# Patient Record
Sex: MALE | HISPANIC OR LATINO | Employment: FULL TIME | URBAN - METROPOLITAN AREA
[De-identification: names, ages, dates, MRNs, and addresses within clinical notes are randomized per-mention and may not be internally consistent; named-entity substitution may affect disease eponyms.]

---

## 2018-04-10 ENCOUNTER — OFFICE VISIT (OUTPATIENT)
Dept: URGENT CARE | Facility: PHYSICIAN GROUP | Age: 28
End: 2018-04-10
Payer: COMMERCIAL

## 2018-04-10 VITALS
RESPIRATION RATE: 16 BRPM | HEIGHT: 69 IN | SYSTOLIC BLOOD PRESSURE: 112 MMHG | HEART RATE: 59 BPM | BODY MASS INDEX: 22.96 KG/M2 | WEIGHT: 155 LBS | DIASTOLIC BLOOD PRESSURE: 58 MMHG | OXYGEN SATURATION: 99 % | TEMPERATURE: 97.9 F

## 2018-04-10 DIAGNOSIS — B02.9 HERPES ZOSTER WITHOUT COMPLICATION: ICD-10-CM

## 2018-04-10 PROCEDURE — 99214 OFFICE O/P EST MOD 30 MIN: CPT | Performed by: PHYSICIAN ASSISTANT

## 2018-04-10 RX ORDER — HYDROXYZINE PAMOATE 25 MG/1
25 CAPSULE ORAL 3 TIMES DAILY PRN
Qty: 30 CAP | Refills: 0 | Status: SHIPPED | OUTPATIENT
Start: 2018-04-10 | End: 2021-08-30

## 2018-04-10 RX ORDER — DIPHENHYDRAMINE HCL 25 MG
25 TABLET ORAL EVERY 6 HOURS PRN
COMMUNITY
End: 2021-08-30

## 2018-04-10 ASSESSMENT — PAIN SCALES - GENERAL: PAINLEVEL: NO PAIN

## 2018-04-13 ASSESSMENT — ENCOUNTER SYMPTOMS
BACK PAIN: 0
DIARRHEA: 0
EYE DISCHARGE: 0
EYE REDNESS: 0
COUGH: 0
EYE PAIN: 0
HEADACHES: 0
FATIGUE: 0
ABDOMINAL PAIN: 0
SHORTNESS OF BREATH: 0
DIZZINESS: 0
FALLS: 0
FEVER: 0
VOMITING: 0
CHILLS: 0
SORE THROAT: 0

## 2018-04-13 NOTE — PROGRESS NOTES
"Subjective:      eDreck Agustin is a 27 y.o. male who presents with Rash (Left side ABD and back, x 1 week)            Rash   This is a new problem. Episode onset: 7-8 days ago. The problem has been gradually improving since onset. Location: Left flank and upper back. The rash is characterized by blistering, pain and redness. He was exposed to nothing. Pertinent negatives include no congestion, cough, diarrhea, eye pain, fatigue, fever, joint pain, shortness of breath, sore throat or vomiting. Past treatments include anti-itch cream. The treatment provided mild relief. His past medical history is significant for varicella.       Review of Systems   Constitutional: Negative for chills, fatigue, fever and malaise/fatigue.   HENT: Negative for congestion and sore throat.    Eyes: Negative for pain, discharge and redness.   Respiratory: Negative for cough and shortness of breath.    Gastrointestinal: Negative for abdominal pain, diarrhea and vomiting.   Musculoskeletal: Negative for back pain, falls and joint pain.   Skin: Positive for itching and rash.   Neurological: Negative for dizziness and headaches.   All other systems reviewed and are negative.         Objective:     /58   Pulse (!) 59   Temp 36.6 °C (97.9 °F)   Resp 16   Ht 1.74 m (5' 8.5\")   Wt 70.3 kg (155 lb)   SpO2 99%   BMI 23.22 kg/m²    PMH:  has a past medical history of Childhood asthma and Pneumonia.  MEDS:   Current Outpatient Prescriptions:   •  diphenhydrAMINE (BENADRYL) 25 MG Tab, Take 25 mg by mouth every 6 hours as needed for Sleep., Disp: , Rfl:   •  hydrOXYzine pamoate (VISTARIL) 25 MG Cap, Take 1 Cap by mouth 3 times a day as needed for Other (May cause sedation)., Disp: 30 Cap, Rfl: 0  •  guaifenesin-codeine (ROBITUSSIN AC) Solution oral solution, Take 5 mL by mouth at bedtime as needed., Disp: 120 mL, Rfl: 0  •  benzonatate (TESSALON) 100 MG Cap, Take 1 Cap by mouth 3 times a day as needed for Cough., Disp: 60 Cap, Rfl: 0  •  " fluticasone (FLONASE) 50 MCG/ACT nasal spray, Spray 1 Spray in nose 2 times a day., Disp: 16 g, Rfl: 0  ALLERGIES: No Known Allergies  SURGHX: No past surgical history on file.  SOCHX:  reports that he quit smoking about 4 years ago. He has never used smokeless tobacco. He reports that he drinks alcohol. He reports that he does not use drugs.  FH: Family history was reviewed, no pertinent findings to report    Physical Exam   Constitutional: He is oriented to person, place, and time. He appears well-developed and well-nourished. No distress.   HENT:   Head: Normocephalic and atraumatic.   Eyes: Conjunctivae and EOM are normal. Pupils are equal, round, and reactive to light.   Neck: Normal range of motion. Neck supple. No tracheal deviation present.   Cardiovascular: Normal rate.    Pulmonary/Chest: Effort normal and breath sounds normal. No respiratory distress.   Musculoskeletal: Normal range of motion. He exhibits no edema.   Neurological: He is alert and oriented to person, place, and time. Coordination normal.   Skin: Rash noted.        2 discrete erythematous patches-lesions are papular with minimal crusting. One discrete vesicular lesion on back. Lesions do not cross midline. Mild tenderness. Without evidence of secondary infection at this time. Without surrounding streaking or significant increased warmth.   Psychiatric: He has a normal mood and affect. His behavior is normal. Judgment and thought content normal.   Vitals reviewed.              Assessment/Plan:     1. Herpes zoster without complication  - hydrOXYzine pamoate (VISTARIL) 25 MG Cap; Take 1 Cap by mouth 3 times a day as needed for Other (May cause sedation).  Dispense: 30 Cap; Refill: 0    Rash currently follows the T10 dermatome-symptoms are consistent with herpes zoster at this time. Patient does have history of chickenpox as a child. Discussed the major symptoms and the potential complications as well as contagious nature of symptoms. Patient  is over a week into his current outbreak at this time-discussed the efficacy of antivirals and such ailment of which is not indicated at this time. Patient is to keep the area covered, avoid itching or scratching or picking.  Patient given precautionary s/sx that mandate immediate follow up and evaluation in the ED. Advised of risks of not doing so.    DDX, Supportive care, and indications for immediate follow-up discussed with patient.    Instructed to return to clinic or nearest emergency department if we are not available for any change in condition, further concerns, or worsening of symptoms.    The patient demonstrated a good understanding and agreed with the treatment plan.

## 2019-01-02 ENCOUNTER — OFFICE VISIT (OUTPATIENT)
Dept: URGENT CARE | Facility: CLINIC | Age: 29
End: 2019-01-02
Payer: COMMERCIAL

## 2019-01-02 VITALS
RESPIRATION RATE: 16 BRPM | SYSTOLIC BLOOD PRESSURE: 122 MMHG | DIASTOLIC BLOOD PRESSURE: 64 MMHG | HEART RATE: 58 BPM | OXYGEN SATURATION: 97 % | TEMPERATURE: 98.2 F

## 2019-01-02 DIAGNOSIS — T15.92XA FOREIGN BODY OF LEFT EYE, INITIAL ENCOUNTER: ICD-10-CM

## 2019-01-02 DIAGNOSIS — S05.02XA ABRASION OF LEFT CORNEA, INITIAL ENCOUNTER: ICD-10-CM

## 2019-01-02 DIAGNOSIS — H18.892 CORNEAL RUST RING OF LEFT EYE: ICD-10-CM

## 2019-01-02 PROCEDURE — 99214 OFFICE O/P EST MOD 30 MIN: CPT | Performed by: PHYSICIAN ASSISTANT

## 2019-01-02 RX ORDER — ERYTHROMYCIN 5 MG/G
OINTMENT OPHTHALMIC
Qty: 1 TUBE | Refills: 0 | Status: SHIPPED | OUTPATIENT
Start: 2019-01-02 | End: 2021-08-30

## 2019-01-02 ASSESSMENT — ENCOUNTER SYMPTOMS
SORE THROAT: 0
VOMITING: 0
SHORTNESS OF BREATH: 0
FOCAL WEAKNESS: 0
SENSORY CHANGE: 0
CHILLS: 0
HEADACHES: 0
DOUBLE VISION: 0
EYE ITCHING: 0
BLURRED VISION: 0
EYE PAIN: 1
FEVER: 0
MYALGIAS: 0
DIZZINESS: 0
EYE DISCHARGE: 1
ABDOMINAL PAIN: 0
EYE REDNESS: 1
PALPITATIONS: 0
FOREIGN BODY SENSATION: 1
SINUS PAIN: 0
PHOTOPHOBIA: 1
TINGLING: 0
COUGH: 0
NAUSEA: 0

## 2019-01-02 ASSESSMENT — VISUAL ACUITY
OD_CC: 20/13
OS_CC: 20/40

## 2019-01-02 NOTE — PROGRESS NOTES
"Subjective:      Dereck Agustin is a 28 y.o. male who presents with Conjunctivitis (x2 days, crustiness in the morning, swelling and pain )            Patient is here with left eye pain and irritation x 2 days. He states, \"I think I may have pink eye.\" He reports his nephew had pink eye last week and he was exposed. He also states he has a foreign body sensation in his eye. He denies any known injury at work or at home. He denies contact use. He has been using Polytrim eyedrops with little relief.       Eye Problem    The left eye is affected. This is a new problem. Episode onset: 2 days. The problem occurs constantly. The problem has been unchanged. There was no injury mechanism. The pain is mild. There is known exposure to pink eye. He does not wear contacts. Associated symptoms include an eye discharge (clear tearing ), eye redness, a foreign body sensation and photophobia. Pertinent negatives include no blurred vision, double vision, fever, itching, nausea, recent URI, tingling or vomiting. Treatments tried: antibiotic eye drops  The treatment provided no relief.       Past Medical History:   Diagnosis Date   • Childhood asthma    • Pneumonia        No past surgical history on file.    No family history on file.    No Known Allergies    Medications, Allergies, and current problem list reviewed today in Epic    Review of Systems   Constitutional: Negative for chills, fever and malaise/fatigue.   HENT: Negative for congestion, ear discharge, ear pain, sinus pain and sore throat.    Eyes: Positive for photophobia, pain (left eye ), discharge (clear tearing ) and redness. Negative for blurred vision, double vision and itching.   Respiratory: Negative for cough and shortness of breath.    Cardiovascular: Negative for chest pain, palpitations and leg swelling.   Gastrointestinal: Negative for abdominal pain, nausea and vomiting.   Musculoskeletal: Negative for myalgias.   Skin: Negative for rash.   Neurological: " Negative for dizziness, tingling, sensory change, focal weakness and headaches.     All other systems reviewed and are negative.      Objective:     /64   Pulse (!) 58   Temp 36.8 °C (98.2 °F)   Resp 16   SpO2 97%      VA: right- 20/13 Left- 20/40 both- 20/13 with correction    Physical Exam   Constitutional: He is oriented to person, place, and time. He appears well-developed and well-nourished. No distress.   HENT:   Head: Normocephalic and atraumatic.   Eyes: Pupils are equal, round, and reactive to light. EOM and lids are normal. Left eye exhibits discharge (clear). Left eye exhibits no exudate and no hordeolum. No foreign body present in the left eye. Left conjunctiva is injected.   Slit lamp exam:       The left eye shows corneal abrasion, foreign body and fluorescein uptake.       Pulmonary/Chest: Effort normal. No respiratory distress.   Neurological: He is alert and oriented to person, place, and time.   Skin: Skin is warm and dry. No rash noted.   Psychiatric: He has a normal mood and affect. His behavior is normal. Judgment and thought content normal.               Assessment/Plan:     1. Foreign body of left eye, initial encounter    2. Corneal rust ring of left eye  3. Abrasion of left cornea, initial encounter    - erythromycin 5 MG/GM Ointment; Apply 1 cm ribbon to left eye 3 times daily x 7 days.  Dispense: 1 Tube; Refill: 0  - REFERRAL TO OPHTHALMOLOGY     Patient denies any injury at work.   He states he felt completely normal while at work and noticed symptoms after being at home.     Foreign body removed. Residual rust wring.  Treat with Erythromycin ointment.  Follow-up with Ophthalmology for rust ring removal- STAT referral placed.      Differential diagnoses, Supportive care, and indications for immediate follow-up discussed with patient.   Instructed to return to clinic or nearest emergency department for any change in condition, further concerns, or worsening of symptoms.    The  patient demonstrated a good understanding and agreed with the treatment plan.  laura Erickson P.A.-C.

## 2020-11-03 ENCOUNTER — HOSPITAL ENCOUNTER (OUTPATIENT)
Dept: LAB | Facility: MEDICAL CENTER | Age: 30
End: 2020-11-03
Attending: INTERNAL MEDICINE
Payer: COMMERCIAL

## 2020-11-03 PROCEDURE — U0003 INFECTIOUS AGENT DETECTION BY NUCLEIC ACID (DNA OR RNA); SEVERE ACUTE RESPIRATORY SYNDROME CORONAVIRUS 2 (SARS-COV-2) (CORONAVIRUS DISEASE [COVID-19]), AMPLIFIED PROBE TECHNIQUE, MAKING USE OF HIGH THROUGHPUT TECHNOLOGIES AS DESCRIBED BY CMS-2020-01-R: HCPCS

## 2020-11-03 PROCEDURE — C9803 HOPD COVID-19 SPEC COLLECT: HCPCS

## 2020-11-04 LAB
COVID ORDER STATUS COVID19: NORMAL
SARS-COV-2 RNA RESP QL NAA+PROBE: DETECTED
SPECIMEN SOURCE: ABNORMAL

## 2020-11-08 ENCOUNTER — TELEPHONE (OUTPATIENT)
Dept: SLEEP MEDICINE | Facility: MEDICAL CENTER | Age: 30
End: 2020-11-08

## 2020-11-08 NOTE — TELEPHONE ENCOUNTER
Informed pt of COVID testing results  Hes feeling improved  Instructed him to inform [facility] PH dept Monday, and self-isolate until he has been asymptomatic x 10 days  All questions answered    __________  Blaise Tavarez MD  Pulmonary and Critical Care Medicine  [facility]

## 2021-08-30 ENCOUNTER — OFFICE VISIT (OUTPATIENT)
Dept: URGENT CARE | Facility: PHYSICIAN GROUP | Age: 31
End: 2021-08-30
Payer: COMMERCIAL

## 2021-08-30 VITALS
TEMPERATURE: 98.6 F | BODY MASS INDEX: 25.71 KG/M2 | HEIGHT: 66 IN | OXYGEN SATURATION: 100 % | HEART RATE: 76 BPM | SYSTOLIC BLOOD PRESSURE: 112 MMHG | WEIGHT: 160 LBS | RESPIRATION RATE: 16 BRPM | DIASTOLIC BLOOD PRESSURE: 54 MMHG

## 2021-08-30 DIAGNOSIS — B02.9 HERPES ZOSTER WITHOUT COMPLICATION: ICD-10-CM

## 2021-08-30 PROCEDURE — 99214 OFFICE O/P EST MOD 30 MIN: CPT | Performed by: PHYSICIAN ASSISTANT

## 2021-08-30 RX ORDER — VALACYCLOVIR HYDROCHLORIDE 1 G/1
1000 TABLET, FILM COATED ORAL 3 TIMES DAILY
Qty: 21 TABLET | Refills: 0 | Status: SHIPPED | OUTPATIENT
Start: 2021-08-30 | End: 2021-09-06

## 2021-08-31 NOTE — PROGRESS NOTES
"Subjective:   Dereck Agustin is a 31 y.o. male who presents for Rash (pos shingles R arm pit )        Patient with history of shingles on the left side of his chest in 2017 presents for evaluation of rash and pain of the right axilla and chest wall that began on Saturday.  Pain is zapping and radiates from the armpit down to his upper chest.  Rash is red and isolated to the armpit at this time.  He does endorse increased stress lately.  He denies nausea, vomiting, fevers, chills.  No aggravating or alleviating factors.  He is not taking any medications for symptoms.    Review of Systems   Skin: Positive for itching and rash.       PMH:  has a past medical history of Childhood asthma and Pneumonia.  MEDS:   Current Outpatient Medications:   •  valacyclovir (VALTREX) 1 GM Tab, Take 1 Tablet by mouth 3 times a day for 7 days., Disp: 21 Tablet, Rfl: 0  •  mupirocin (BACTROBAN) 2 % Ointment, Apply 1 Application topically 2 times a day., Disp: 22 g, Rfl: 0  ALLERGIES: No Known Allergies  SURGHX: No past surgical history on file.  SOCHX:  reports that he quit smoking about 7 years ago. He has never used smokeless tobacco. He reports current alcohol use. He reports that he does not use drugs.  FH: Family history was reviewed, no pertinent findings to report   Objective:   /54   Pulse 76   Temp 37 °C (98.6 °F) (Temporal)   Resp 16   Ht 1.676 m (5' 6\")   Wt 72.6 kg (160 lb)   SpO2 100%   BMI 25.82 kg/m²   Physical Exam  Vitals reviewed.   Constitutional:       General: He is not in acute distress.     Appearance: Normal appearance. He is well-developed. He is not toxic-appearing.   HENT:      Head: Normocephalic and atraumatic.      Right Ear: External ear normal.      Left Ear: External ear normal.      Nose: Nose normal.   Eyes:      General: Gaze aligned appropriately.   Cardiovascular:      Rate and Rhythm: Normal rate and regular rhythm.   Pulmonary:      Effort: Pulmonary effort is normal. No respiratory " distress.      Breath sounds: No stridor.   Musculoskeletal:      Cervical back: Neck supple.   Skin:     General: Skin is warm and dry.      Capillary Refill: Capillary refill takes less than 2 seconds.      Comments: 3 separate lesions of small yellowish vesicles on erythematous base in the right axilla.  No lesions on the arm, chest wall, back.   Neurological:      Mental Status: He is alert and oriented to person, place, and time.      Comments: CN2-12 grossly intact   Psychiatric:         Speech: Speech normal.         Behavior: Behavior normal.           Assessment/Plan:   1. Herpes zoster without complication  - valacyclovir (VALTREX) 1 GM Tab; Take 1 Tablet by mouth 3 times a day for 7 days.  Dispense: 21 Tablet; Refill: 0  - mupirocin (BACTROBAN) 2 % Ointment; Apply 1 Application topically 2 times a day.  Dispense: 22 g; Refill: 0    Shingles versus folliculitis.  Rest looks more like shingles to me.  Patient started on Valtrex.  As a precaution I will put him on topical mupirocin twice a day.  He should avoid touching the area in order to avoid spread.  If no improvement in 3 to 4 days, new symptoms develop, symptoms worsen return to clinic or see PCP for reevaluation.      Differential diagnosis, natural history, supportive care, and indications for immediate follow-up discussed.

## 2023-02-16 ENCOUNTER — APPOINTMENT (RX ONLY)
Dept: URBAN - METROPOLITAN AREA CLINIC 6 | Facility: CLINIC | Age: 33
Setting detail: DERMATOLOGY
End: 2023-02-16

## 2023-02-16 DIAGNOSIS — D22 MELANOCYTIC NEVI: ICD-10-CM

## 2023-02-16 PROBLEM — D48.5 NEOPLASM OF UNCERTAIN BEHAVIOR OF SKIN: Status: ACTIVE | Noted: 2023-02-16

## 2023-02-16 PROCEDURE — 11102 TANGNTL BX SKIN SINGLE LES: CPT

## 2023-02-16 PROCEDURE — ? BIOPSY BY SHAVE METHOD

## 2023-02-16 ASSESSMENT — LOCATION ZONE DERM: LOCATION ZONE: SCALP

## 2023-02-16 ASSESSMENT — LOCATION DETAILED DESCRIPTION DERM: LOCATION DETAILED: RIGHT CENTRAL PARIETAL SCALP

## 2023-02-16 ASSESSMENT — LOCATION SIMPLE DESCRIPTION DERM: LOCATION SIMPLE: SCALP

## 2023-02-16 NOTE — PROCEDURE: BIOPSY BY SHAVE METHOD

## 2023-11-23 ENCOUNTER — APPOINTMENT (OUTPATIENT)
Dept: URGENT CARE | Facility: PHYSICIAN GROUP | Age: 33
End: 2023-11-23

## 2023-11-23 ENCOUNTER — OFFICE VISIT (OUTPATIENT)
Dept: URGENT CARE | Facility: PHYSICIAN GROUP | Age: 33
End: 2023-11-23
Payer: COMMERCIAL

## 2023-11-23 VITALS
BODY MASS INDEX: 25.68 KG/M2 | TEMPERATURE: 97 F | RESPIRATION RATE: 20 BRPM | SYSTOLIC BLOOD PRESSURE: 116 MMHG | HEART RATE: 87 BPM | DIASTOLIC BLOOD PRESSURE: 76 MMHG | WEIGHT: 163.6 LBS | HEIGHT: 67 IN | OXYGEN SATURATION: 94 %

## 2023-11-23 DIAGNOSIS — J02.9 SORE THROAT: ICD-10-CM

## 2023-11-23 DIAGNOSIS — J02.9 ACUTE VIRAL PHARYNGITIS: ICD-10-CM

## 2023-11-23 LAB — S PYO DNA SPEC NAA+PROBE: NOT DETECTED

## 2023-11-23 PROCEDURE — 99214 OFFICE O/P EST MOD 30 MIN: CPT | Performed by: NURSE PRACTITIONER

## 2023-11-23 PROCEDURE — 3074F SYST BP LT 130 MM HG: CPT | Performed by: NURSE PRACTITIONER

## 2023-11-23 PROCEDURE — 87651 STREP A DNA AMP PROBE: CPT | Performed by: NURSE PRACTITIONER

## 2023-11-23 PROCEDURE — 3078F DIAST BP <80 MM HG: CPT | Performed by: NURSE PRACTITIONER

## 2023-11-23 RX ORDER — LIDOCAINE HYDROCHLORIDE 20 MG/ML
5 SOLUTION OROPHARYNGEAL 4 TIMES DAILY PRN
Qty: 120 ML | Refills: 0 | Status: SHIPPED | OUTPATIENT
Start: 2023-11-23

## 2023-11-23 NOTE — PROGRESS NOTES
"Chief Complaint   Patient presents with    Pharyngitis     2 days        HISTORY OF PRESENT ILLNESS: Patient is a pleasant 33 y.o. male who presents today due to complaints of a sore throat for the past two days. Reports associated fever, chills, pain with swallowing. Pain is moderate. Denies associated rash, chest pain, urinary complaints, congestion, cough, or difficulty breathing. He has tried OTC medications at home without much improvement. Denies known ill contacts. Took a home COVID test which was negative.       Patient Active Problem List    Diagnosis Date Noted    Sprain of left knee 02/19/2014       Allergies:Patient has no known allergies.    No current [facility]-ordered outpatient medications on file.     No current [facility]-ordered facility-administered medications on file.       Past Medical History:   Diagnosis Date    Childhood asthma     Pneumonia        Social History     Tobacco Use    Smoking status: Former     Current packs/day: 0.00     Types: Cigarettes     Quit date: 11/20/2013     Years since quitting: 10.0    Smokeless tobacco: Never   Vaping Use    Vaping Use: Never used   Substance Use Topics    Alcohol use: Yes     Comment: occ    Drug use: No       No family status information on file.   History reviewed. No pertinent family history.    ROS:  Review of Systems   Constitutional: Positive for fever, chills. Negative for weight loss, malaise, and fatigue.   HENT: Positive for sore throat. Negative for ear pain, nosebleeds, congestion.   Eyes: Negative for vision changes.   Cardiovascular: Negative for chest pain, palpitations, orthopnea and leg swelling.   Respiratory: Negative for cough, sputum production, shortness of breath and wheezing.   Gastrointestinal: Negative for abdominal pain, nausea, vomiting or diarrhea.   : Negative for changes in urination.   Skin: Negative for rash, diaphoresis.     Exam:  /76   Pulse 87   Temp 36.1 °C (97 °F) (Temporal)   Resp 20   Ht 1.702 m (5' 7\")  "  Wt 74.2 kg (163 lb 9.6 oz)   SpO2 94%   General: well-nourished, well-developed male in NAD  Head: normocephalic, atraumatic  Eyes: PERRLA, no conjunctival injection, acuity grossly intact, lids normal.  Ears: normal shape and symmetry, no tenderness, no discharge. External canals are without any significant edema or erythema. Tympanic membranes are without any inflammation, no effusion. Gross auditory acuity is intact.  Nose: symmetrical without tenderness, no discharge.  Mouth/Throat: reasonable hygiene. There is erythema, without exudates or tonsillar enlargement present.  Neck: no masses, range of motion within normal limits, no tracheal deviation. No obvious thyroid enlargement.   Lymph: bilateral anterior cervical adenopathy. No supraclavicular adenopathy.   Neuro: alert and oriented. Cranial nerves 1-12 grossly intact. No sensory deficit.   Cardiovascular: regular rate and rhythm. No edema.  Pulmonary: no distress. Chest is symmetrical with respiration, no wheezes, crackles, or rhonchi.   Musculoskeletal: no clubbing, appropriate muscle tone, gait is stable.  Skin: warm, dry, intact, no clubbing, no cyanosis, no rashes.   Psych: appropriate mood, affect, judgement.       POC strep negative      Assessment/Plan:  1. Acute viral pharyngitis        2. Sore throat  POCT GROUP A STREP, PCR    lidocaine (XYLOCAINE) 2 % Solution                Discussed symptoms most likely viral, self limiting illness. Did not see any evidence of a bacterial process, POC negative. Discussed natural progression and sx care. OTC motrin or tylenol for pain/fever control. Hand hygiene. Increase fluid intake, rest. Warm salt water gargles. Lidocaine PRN.   Supportive care, differential diagnoses, and indications for immediate follow-up discussed with patient.   Pathogenesis of diagnosis discussed including typical length and natural progression.   Instructed to return to clinic or nearest emergency department for any change in  Detail Level: Generalized condition, further concerns, or worsening of symptoms.  Patient states understanding of the plan of care and discharge instructions.  Instructed to make an appointment, for follow up, with his primary care provider.        Please note that this dictation was created using voice recognition software. I have made every reasonable attempt to correct obvious errors, but I expect that there are errors of grammar and possibly content that I did not discover before finalizing the note.       RAY Cody.      Detail Level: Zone Detail Level: Detailed Sunscreen Recommendations: BERNIE

## 2024-04-07 ENCOUNTER — OFFICE VISIT (OUTPATIENT)
Dept: URGENT CARE | Facility: PHYSICIAN GROUP | Age: 34
End: 2024-04-07

## 2024-04-07 VITALS
OXYGEN SATURATION: 96 % | DIASTOLIC BLOOD PRESSURE: 78 MMHG | TEMPERATURE: 98.8 F | RESPIRATION RATE: 16 BRPM | SYSTOLIC BLOOD PRESSURE: 106 MMHG | HEIGHT: 66 IN | BODY MASS INDEX: 27 KG/M2 | HEART RATE: 63 BPM | WEIGHT: 168 LBS

## 2024-04-07 DIAGNOSIS — J01.90 ACUTE BACTERIAL SINUSITIS: ICD-10-CM

## 2024-04-07 DIAGNOSIS — B96.89 ACUTE BACTERIAL SINUSITIS: ICD-10-CM

## 2024-04-07 PROCEDURE — 99213 OFFICE O/P EST LOW 20 MIN: CPT | Performed by: PHYSICIAN ASSISTANT

## 2024-04-07 PROCEDURE — 3074F SYST BP LT 130 MM HG: CPT | Performed by: PHYSICIAN ASSISTANT

## 2024-04-07 PROCEDURE — 3078F DIAST BP <80 MM HG: CPT | Performed by: PHYSICIAN ASSISTANT

## 2024-04-07 RX ORDER — AMOXICILLIN AND CLAVULANATE POTASSIUM 875; 125 MG/1; MG/1
1 TABLET, FILM COATED ORAL 2 TIMES DAILY
Qty: 14 TABLET | Refills: 0 | Status: SHIPPED | OUTPATIENT
Start: 2024-04-07 | End: 2024-04-14

## 2024-04-07 ASSESSMENT — ENCOUNTER SYMPTOMS
SORE THROAT: 1
SINUS PAIN: 1
COUGH: 1
SPUTUM PRODUCTION: 1

## 2024-04-07 NOTE — PROGRESS NOTES
"Subjective:   Dereck Agustin is a 33 y.o. male who presents for Congestion (Dark mucus, x5 days )      5 day history of malaise, sore throat, congestion and mucus production.  Has noted more mucus production and sinus pain and pressure since yesterday.  Notes ear pressure and sinus headache.      Review of Systems   Constitutional:  Positive for malaise/fatigue.   HENT:  Positive for congestion, ear pain, sinus pain and sore throat.    Respiratory:  Positive for cough and sputum production.        Medications, Allergies, and current problem list reviewed today in Epic.     Objective:     /78 (BP Location: Right arm, Patient Position: Sitting, BP Cuff Size: Adult)   Pulse 63   Temp 37.1 °C (98.8 °F) (Temporal)   Resp 16   Ht 1.676 m (5' 6\")   Wt 76.2 kg (168 lb)   SpO2 96%     Physical Exam  Vitals reviewed.   Constitutional:       Appearance: Normal appearance.   HENT:      Head: Normocephalic and atraumatic.      Right Ear: Tympanic membrane, ear canal and external ear normal.      Left Ear: Tympanic membrane, ear canal and external ear normal.      Nose: Congestion and rhinorrhea present.      Mouth/Throat:      Mouth: Mucous membranes are moist.      Pharynx: No oropharyngeal exudate or posterior oropharyngeal erythema.      Comments: POST NASAL DRIP  Eyes:      Conjunctiva/sclera: Conjunctivae normal.   Cardiovascular:      Rate and Rhythm: Normal rate and regular rhythm.   Pulmonary:      Effort: Pulmonary effort is normal.      Breath sounds: Normal breath sounds.   Musculoskeletal:      Cervical back: Normal range of motion.   Lymphadenopathy:      Cervical: No cervical adenopathy.   Skin:     General: Skin is warm and dry.      Capillary Refill: Capillary refill takes less than 2 seconds.   Neurological:      Mental Status: He is alert and oriented to person, place, and time.         Assessment/Plan:     Diagnosis and associated orders:     1. Acute bacterial sinusitis  amoxicillin-clavulanate " (AUGMENTIN) 875-125 MG Tab         Comments/MDM:     Patient meets IDSA guidelines for ABRS given duration of symptoms, worsening severity, clinical history and physical exam  Consider antihistamine, nasal steroid, anti-inflammatory, and saline rinses  No evidence of venous sinus thrombosis or more serious etiology  Typically these infections display a slow resolution of symptoms starting at 48-72 hours of treatment.  Mild pressure and pain may continue at end of week of antibiotics which is normal and continue the other supportive care until back to baseline.           Differential diagnosis, natural history, supportive care, and indications for immediate follow-up discussed.    Advised the patient to follow-up with the primary care physician for recheck, reevaluation, and consideration of further management.    Please note that this dictation was created using voice recognition software. I have made a reasonable attempt to correct obvious errors, but I expect that there are errors of grammar and possibly content that I did not discover before finalizing the note.    This note was electronically signed by Kody Sprague PA-C